# Patient Record
Sex: MALE | Race: AMERICAN INDIAN OR ALASKA NATIVE
[De-identification: names, ages, dates, MRNs, and addresses within clinical notes are randomized per-mention and may not be internally consistent; named-entity substitution may affect disease eponyms.]

---

## 2019-04-05 ENCOUNTER — HOSPITAL ENCOUNTER (OUTPATIENT)
Dept: HOSPITAL 5 - NM | Age: 54
Discharge: HOME | End: 2019-04-05
Attending: UROLOGY
Payer: COMMERCIAL

## 2019-04-05 DIAGNOSIS — C61: Primary | ICD-10-CM

## 2019-04-05 PROCEDURE — 78306 BONE IMAGING WHOLE BODY: CPT

## 2019-04-05 PROCEDURE — A9503 TC99M MEDRONATE: HCPCS

## 2019-04-06 NOTE — NUCLEAR MEDICINE REPORT
PROCEDURE: NM BONE SCAN WHOLE BODY 

 

TECHNIQUE:  Nuclear medicine bone scan 

 

Whole body bone scan performed following intravenous administration of 25 mCi technetium 99m MDP 

 

HISTORY: MALIGNANT NEOPLASM OF PROSTATE 

 

COMPARISONS: 

 

FINDINGS: 

 

There is moderately increased activity at both knee joints with greater activity on the right. Increa
sed activity at both distal femurs and tibial plateaus. Also noted is increased activity about the ta
christy. 

 

There is mildly increased activity at the right shoulder and the sternomanubrial joints. No additiona
l foci of abnormal activity are observed. 

 

IMPRESSION:  

 

Increased activity at both knees consistent with DJD greater on the right. 

 

This document is electronically signed by Richard Pelayo MD., April 6 2019 04:44:11 PM ET